# Patient Record
Sex: MALE | Race: WHITE | URBAN - METROPOLITAN AREA
[De-identification: names, ages, dates, MRNs, and addresses within clinical notes are randomized per-mention and may not be internally consistent; named-entity substitution may affect disease eponyms.]

---

## 2019-07-19 ENCOUNTER — OFFICE VISIT (OUTPATIENT)
Dept: FAMILY MEDICINE | Facility: CLINIC | Age: 24
End: 2019-07-19
Payer: COMMERCIAL

## 2019-07-19 VITALS
TEMPERATURE: 97.3 F | RESPIRATION RATE: 16 BRPM | HEART RATE: 87 BPM | WEIGHT: 169 LBS | DIASTOLIC BLOOD PRESSURE: 78 MMHG | OXYGEN SATURATION: 99 % | SYSTOLIC BLOOD PRESSURE: 118 MMHG | BODY MASS INDEX: 24.2 KG/M2 | HEIGHT: 70 IN

## 2019-07-19 DIAGNOSIS — R10.84 ABDOMINAL DISCOMFORT, GENERALIZED: Primary | ICD-10-CM

## 2019-07-19 SDOH — HEALTH STABILITY: MENTAL HEALTH: HOW MANY STANDARD DRINKS CONTAINING ALCOHOL DO YOU HAVE ON A TYPICAL DAY?: 1 OR 2

## 2019-07-19 SDOH — HEALTH STABILITY: MENTAL HEALTH: HOW OFTEN DO YOU HAVE A DRINK CONTAINING ALCOHOL?: 2-4 TIMES A MONTH

## 2019-07-19 SDOH — HEALTH STABILITY: MENTAL HEALTH: HOW OFTEN DO YOU HAVE 6 OR MORE DRINKS ON ONE OCCASION?: NEVER

## 2019-07-19 ASSESSMENT — ANXIETY QUESTIONNAIRES
3. WORRYING TOO MUCH ABOUT DIFFERENT THINGS: SEVERAL DAYS
GAD7 TOTAL SCORE: 1
7. FEELING AFRAID AS IF SOMETHING AWFUL MIGHT HAPPEN: NOT AT ALL
1. FEELING NERVOUS, ANXIOUS, OR ON EDGE: NOT AT ALL
5. BEING SO RESTLESS THAT IT IS HARD TO SIT STILL: NOT AT ALL
2. NOT BEING ABLE TO STOP OR CONTROL WORRYING: NOT AT ALL
IF YOU CHECKED OFF ANY PROBLEMS ON THIS QUESTIONNAIRE, HOW DIFFICULT HAVE THESE PROBLEMS MADE IT FOR YOU TO DO YOUR WORK, TAKE CARE OF THINGS AT HOME, OR GET ALONG WITH OTHER PEOPLE: NOT DIFFICULT AT ALL
6. BECOMING EASILY ANNOYED OR IRRITABLE: NOT AT ALL

## 2019-07-19 ASSESSMENT — PATIENT HEALTH QUESTIONNAIRE - PHQ9
SUM OF ALL RESPONSES TO PHQ QUESTIONS 1-9: 5
5. POOR APPETITE OR OVEREATING: NOT AT ALL

## 2019-07-19 ASSESSMENT — MIFFLIN-ST. JEOR: SCORE: 1756.48

## 2019-07-19 NOTE — PATIENT INSTRUCTIONS
Patient Education     GERD (Adult)    The esophagus is a tube that carries food from the mouth to the stomach. A valve (the LES, lower esophageal sphincter) at the lower end of the esophagus prevents stomach acid from flowing upward. When this valve doesn't work properly, stomach contents may repeatedly flow back up (reflux) into the esophagus. This is called gastroesophageal reflux disease (GERD). GERD can irritate the esophagus. It can cause problems with pain, swallowing or breathing. In severe cases, GERD can cause recurrent pneumonia (from aspiration or breathing in particles) or other serious problems.  Symptoms of reflux include burning, pressure or sharp pain in the upper abdomen or mid to lower chest. The pain can spread to the neck, back, or shoulder. There may be belching, an acid taste in the back of the throat, chronic cough, or sore throat, or hoarseness. GERD symptoms often occur during the day after a big meal. They can also occur at night when lying down.   Home care  Lifestyle changes can help reduce symptoms. If needed, your healthcare provider may prescribe medicines. Symptoms often improve with treatment, but if treatment is stopped, the symptoms often return after a few months. So most persons with GERD will need to continue treatment or get treatment on and off.  Lifestyle changes    Limit or avoid fatty, fried, and spicy foods, as well as coffee, chocolate, mint, and foods with high acid content such as tomatoes and citrus fruit and juices (orange, grapefruit, lemon).    Don t eat large meals, especially at night. Frequent, smaller meals are best. Don't lie down right after eating. And don t eat anything 3 hours before going to bed.    Don't drink alcohol or smoke. As much as possible, stay away from second hand smoke.    If you are overweight, losing weight will reduce symptoms.     Don't wear tight clothing around your stomach area.    If your symptoms occur during sleep, use a foam wedge  "to elevate your upper body (not just your head.) Or, place 4\" blocks under the head of your bed. Or use 2 bed risers under your bedframe.  Medicines  If needed, medicines can help relieve the symptoms of GERD and prevent damage to the esophagus. Discuss a medicine plan with your healthcare provider. This may include one or more of the following medicines:    Antacids to help neutralize the normal acids in your stomach.    Acid blockers (Histamine or H2 blockers) to decrease acid production.    Acid inhibitors (proton pump inhibitors PPIs) to decrease acid production in a different way than the blockers. They may work better, but can take a little longer to take effect.  Take an antacid 30 to 60 minutes after eating and at bedtime, but not at the same time as an acid blocker.Try not to take medicines such as ibuprofen and aspirin. If you are taking aspirin for your heart or other medical reasons, talk to your healthcare provider about stopping it.  Follow-up care  Follow up with your healthcare provider or as advised by our staff.  When to seek medical advice  Call your healthcare provider if any of the following occur:    Stomach pain gets worse or moves to the lower right abdomen (appendix area)    Chest pain appears or gets worse, or spreads to the back, neck, shoulder, or arm    An over-the-counter trial of medicine doesn't relieve your symptoms    Weight loss that can't be explained    Trouble or pain swallowing    Frequent vomiting (can t keep down liquids)    Blood in the stool or vomit (red or black in color)    Feeling weak or dizzy    Fever of 100.4 F (38 C) or higher, or as directed by your healthcare provider  Date Last Reviewed: 3/1/2018    3757-8811 [employer]. [address] [place] [address], [place] [address]. All rights reserved. This information is not intended as a substitute for professional medical care. Always follow your healthcare professional's instructions.           "

## 2019-07-19 NOTE — NURSING NOTE
"24 year old  Chief Complaint   Patient presents with     Abdominal Pain     Pt. presents to the clinic today with stomach pain X 9 days.        Blood pressure 118/78, pulse 87, temperature 97.3  F (36.3  C), temperature source Oral, resp. rate 16, height 1.768 m (5' 9.6\"), weight 76.7 kg (169 lb), SpO2 99 %. Body mass index is 24.53 kg/m .  BP completed using cuff size:    There is no problem list on file for this patient.      Wt Readings from Last 2 Encounters:   07/19/19 76.7 kg (169 lb)     BP Readings from Last 3 Encounters:   07/19/19 118/78       No Known Allergies    No current outpatient medications on file.     No current facility-administered medications for this visit.        Social History     Tobacco Use     Smoking status: Never Smoker     Smokeless tobacco: Never Used   Substance Use Topics     Alcohol use: Yes     Frequency: 2-4 times a month     Drinks per session: 1 or 2     Binge frequency: Never     Drug use: Never       Honoring Choices - Health Care Directive Guide offered to patient at time of visit.    Health Maintenance Due   Topic Date Due     PREVENTIVE CARE VISIT  1995     DTAP/TDAP/TD IMMUNIZATION (1 - Tdap) 06/05/2002     HIV SCREENING  06/05/2010     PHQ-2  01/01/2019         There is no immunization history on file for this patient.    No results found for: PAP      No lab results found.    PHQ-2 ( 1999 Pfizer) 7/19/2019   Q1: Little interest or pleasure in doing things 0   Q2: Feeling down, depressed or hopeless 0   PHQ-2 Score 0       PHQ-9 SCORE 7/19/2019   PHQ-9 Total Score 5       JATIN-7 SCORE 7/19/2019   Total Score 1       No flowsheet data found.    Risa White CMA  July 19, 2019 1:05 PM    "
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
## 2019-07-20 ASSESSMENT — ANXIETY QUESTIONNAIRES: GAD7 TOTAL SCORE: 1

## 2019-08-06 ENCOUNTER — OFFICE VISIT (OUTPATIENT)
Dept: FAMILY MEDICINE | Facility: CLINIC | Age: 24
End: 2019-08-06
Payer: COMMERCIAL

## 2019-08-06 VITALS
HEART RATE: 62 BPM | HEIGHT: 70 IN | TEMPERATURE: 98 F | OXYGEN SATURATION: 96 % | BODY MASS INDEX: 24.09 KG/M2 | DIASTOLIC BLOOD PRESSURE: 78 MMHG | SYSTOLIC BLOOD PRESSURE: 115 MMHG | RESPIRATION RATE: 16 BRPM | WEIGHT: 168.3 LBS

## 2019-08-06 DIAGNOSIS — K21.9 GASTROESOPHAGEAL REFLUX DISEASE, ESOPHAGITIS PRESENCE NOT SPECIFIED: Primary | ICD-10-CM

## 2019-08-06 ASSESSMENT — MIFFLIN-ST. JEOR: SCORE: 1761.24

## 2019-08-06 NOTE — NURSING NOTE
"24 year old  Chief Complaint   Patient presents with     Abdominal Pain     acid reflux, [place], took for 3 [place] and stopped per directions, symptoms came back, feels like stomach is burning       Blood pressure 115/78, pulse 62, temperature 98  F (36.7  C), temperature source Oral, resp. rate 16, height 1.781 m (5' 10.1\"), weight 76.3 kg (168 lb 4.8 oz), SpO2 96 %. Body mass index is 24.08 kg/m .  BP completed using cuff size:    There is no problem list on file for this patient.      Wt Readings from Last 2 Encounters:   08/06/19 76.3 kg (168 lb 4.8 oz)   07/19/19 76.7 kg (169 lb)     BP Readings from Last 3 Encounters:   08/06/19 115/78   07/19/19 118/78       No Known Allergies    Current Outpatient Medications   Medication     omeprazole (PRILOSEC) 20 MG DR capsule     No current facility-administered medications for this visit.        Social History     Tobacco Use     Smoking status: Never Smoker     Smokeless tobacco: Never Used   Substance Use Topics     Alcohol use: Yes     Frequency: 2-4 times a month     Drinks per session: 1 or 2     Binge frequency: Never     Drug use: Never       Honoring Choices - Health Care Directive Guide offered to patient at time of visit.    Health Maintenance Due   Topic Date Due     PREVENTIVE CARE VISIT  1995     DTAP/TDAP/TD IMMUNIZATION (1 - Tdap) 06/05/2002     HIV SCREENING  06/05/2010         There is no immunization history on file for this patient.    No results found for: PAP      No lab results found.    PHQ-2 ( 1999 Pfizer) 7/19/2019   Q1: Little interest or pleasure in doing things 0   Q2: Feeling down, depressed or hopeless 0   PHQ-2 Score 0       PHQ-9 SCORE 7/19/2019   PHQ-9 Total Score 5       JATIN-7 SCORE 7/19/2019   Total Score 1       No flowsheet data found.      Flower El CMA  August 6, 2019 8:17 AM    "

## 2019-08-06 NOTE — PROGRESS NOTES
HPI       Dain MYRNA Yap is a 24 year old male with no significant past medical history who presents for     Chief Complaint   Patient presents with     Abdominal Pain     acid reflux, [place] took for 3 week and stopped per directions, symptoms came back, feels like stomach is burning       Dain presents today for abdominal pain follow-up. He was evaluated for this problem on 7/15/19 in urgent care and again on 7/19/19 by this writer. Strep test, hepatic function panel, CBC with differential, BMP, and lipase were all normal. GI cocktail administered in urgent care helped to relieve the burning sensation in his abdomen. It was thought that his abdominal pain was most consistent with GERD and he was recommended to initiate a 3 week trial of omeprazole in addition to lifestyle and dietary changes. Was instructed to take omeprazole twice daily x one week, then once daily x two additional weeks, then stop.     Dain reports today that his abdominal pain improved on the three week trial of omeprazole, however three days after completing the trial his abdominal pain returned, described as generalized burning sensation in his abdomen occurring two hours after eating food and while laying down, sometimes discomfort radiates up into his middle chest. He notes the burning to be severe to the point where he feels the warmth on the skin of his abdomen. When pain is present, it causes sleep disruptions. He did not go to work yesterday due to not sleeping well the night prior. Reports he restarted the omeprazole secondary to severity of symptoms.     Dain notes that despite improvement in his abdominal discomfort while on omeprazole, abdominal bloating has persisted. He also complains of increased burping. Nausea seems to have improved. He reports abdominal cramping that has persisted since initial onset of abdominal pain, he doesn't feel this is connected to eating food. Reports he is having  "normal bowel movements, denies constipation, diarrhea, and blood in the stool. He continues to be active, plays tennis regularly and tolerates this quite well.     Dain reports that since symptom onset he has been abstaining from alcohol use, NSAID use, and caffeine. He feels confident that his symptoms are tied to acid reflux and has been avoiding identified dietary triggers. Admits that he occasionally slips on his diet and experiences symptoms of acid reflux.       Wt Readings from Last 2 Encounters:   08/06/19 76.3 kg (168 lb 4.8 oz)   07/19/19 76.7 kg (169 lb)         Past Medical History:   Diagnosis Date     NO ACTIVE PROBLEMS      Current Outpatient Medications   Medication     omeprazole (PRILOSEC) 20 MG DR capsule     No current facility-administered medications for this visit.       No Known Allergies      Problem, Medication and Allergy Lists were reviewed and updated if needed..    Patient is an established patient of this clinic..         Review of Systems:   Review of Systems   Constitutional: Negative for activity change, fever and unexpected weight change.   Gastrointestinal: Positive for abdominal distention and abdominal pain. Negative for blood in stool, constipation, diarrhea, nausea and vomiting.   Psychiatric/Behavioral: Positive for sleep disturbance.          Physical Exam:     Vitals:    08/06/19 0816   BP: 115/78   Pulse: 62   Resp: 16   Temp: 98  F (36.7  C)   TempSrc: Oral   SpO2: 96%   Weight: 76.3 kg (168 lb 4.8 oz)   Height: 1.781 m (5' 10.1\")     Body mass index is 24.08 kg/m .  Vitals were reviewed and were normal.     Physical Exam   Constitutional: He is oriented to person, place, and time. He appears well-developed and well-nourished. No distress.   Cardiovascular: Normal rate.   Pulmonary/Chest: Effort normal. No respiratory distress.   Abdominal: Soft. He exhibits no distension and no mass. Bowel sounds are increased. There is no tenderness.   Neurological: He is alert and " oriented to person, place, and time.   Skin: Skin is warm and dry.   Psychiatric: He has a normal mood and affect. His behavior is normal.       Results:     No laboratory testing was completed at today's visit.    Assessment and Plan      1. Gastroesophageal reflux disease, esophagitis presence not specified  Comment: Pt with recent hx of abdominal discomfort most consistent with GERD, improved on 3 weeks trial of PPI. Pain returned upon completion of trial, causing sleep disruptions and missed days at work. Physical exam is normal today and patient is well appearing. Recommend restarting and continuing PPI x 2 months, will consider step down to H2RA at that time if symptoms are well controlled. Pt is in agreement with this plan.    Plan:   - omeprazole (PRILOSEC) 20 MG DR capsule; Take 1 capsule (20 mg) by mouth daily  Dispense: 30 capsule; Refill: 1   - Reinforced how to take above medication appropriately   - Continue avoiding dietary triggers and implementing lifestyle changes   - Avoid NSAIDs, alcohol, caffeine   - Counseled on reasons to return to clinic    Follow-up: Return to clinic in 6 weeks for medication management and follow-up.     There are no discontinued medications.    Options for treatment and follow-up care were reviewed with the patient. Dain Yap  engaged in the decision making process and verbalized understanding of the options discussed and agreed with the final plan.    LETICIA Kat CNP

## 2019-08-07 ASSESSMENT — ENCOUNTER SYMPTOMS
DIARRHEA: 0
ABDOMINAL DISTENTION: 1
ACTIVITY CHANGE: 0
CONSTIPATION: 0
VOMITING: 0
UNEXPECTED WEIGHT CHANGE: 0
NAUSEA: 0
SLEEP DISTURBANCE: 1
BLOOD IN STOOL: 0
FEVER: 0
ABDOMINAL PAIN: 1

## 2019-09-23 ENCOUNTER — TELEPHONE (OUTPATIENT)
Dept: FAMILY MEDICINE | Facility: CLINIC | Age: 24
End: 2019-09-23

## 2019-09-23 DIAGNOSIS — K21.9 GASTROESOPHAGEAL REFLUX DISEASE, ESOPHAGITIS PRESENCE NOT SPECIFIED: Primary | ICD-10-CM

## 2019-09-23 NOTE — TELEPHONE ENCOUNTER
[facility] phone call message - order or referral request from patient:     Order or referral being requested: Referral to Gastroenterology     Additional Details: Medication is working but still underlying symptoms but still have the other symptoms (dull ache, bloating, sore throat from acid issues) Patient would like to get a referral to Gastroenterology.    OK to leave a message on voice mail? Yes    Primary language: English      needed? No    Call taken on September 23, 2019 at 10:17 AM by Flower El CMA

## 2019-10-03 ENCOUNTER — TRANSFERRED RECORDS (OUTPATIENT)
Dept: HEALTH INFORMATION MANAGEMENT | Facility: CLINIC | Age: 24
End: 2019-10-03

## 2019-10-21 ENCOUNTER — TRANSFERRED RECORDS (OUTPATIENT)
Dept: HEALTH INFORMATION MANAGEMENT | Facility: CLINIC | Age: 24
End: 2019-10-21

## 2019-12-05 ENCOUNTER — TRANSFERRED RECORDS (OUTPATIENT)
Dept: HEALTH INFORMATION MANAGEMENT | Facility: CLINIC | Age: 24
End: 2019-12-05

## 2020-12-24 ENCOUNTER — OFFICE VISIT (OUTPATIENT)
Dept: FAMILY MEDICINE | Facility: CLINIC | Age: 25
End: 2020-12-24
Payer: COMMERCIAL

## 2020-12-24 VITALS
TEMPERATURE: 97.4 F | HEIGHT: 70 IN | WEIGHT: 169 LBS | HEART RATE: 71 BPM | OXYGEN SATURATION: 99 % | SYSTOLIC BLOOD PRESSURE: 135 MMHG | RESPIRATION RATE: 16 BRPM | BODY MASS INDEX: 24.2 KG/M2 | DIASTOLIC BLOOD PRESSURE: 88 MMHG

## 2020-12-24 DIAGNOSIS — H61.23 EXCESSIVE CERUMEN IN BOTH EAR CANALS: ICD-10-CM

## 2020-12-24 DIAGNOSIS — R07.0 THROAT PAIN: Primary | ICD-10-CM

## 2020-12-24 LAB — S PYO AG THROAT QL IA.RAPID: NEGATIVE

## 2020-12-24 ASSESSMENT — MIFFLIN-ST. JEOR: SCORE: 1757.83

## 2020-12-24 NOTE — NURSING NOTE
"25 year old  Chief Complaint   Patient presents with     Pharyngitis     Pt. presents to the clinic today with swollen tonsils X 2 plus weeks.        Blood pressure 135/88, pulse 71, temperature 97.4  F (36.3  C), temperature source Oral, resp. rate 16, height 1.778 m (5' 10\"), weight 76.7 kg (169 lb), SpO2 99 %. Body mass index is 24.25 kg/m .  BP completed using cuff size:    There is no problem list on file for this patient.      Wt Readings from Last 2 Encounters:   12/24/20 76.7 kg (169 lb)   08/06/19 76.3 kg (168 lb 4.8 oz)     BP Readings from Last 3 Encounters:   12/24/20 135/88   08/06/19 115/78   07/19/19 118/78       No Known Allergies    Current Outpatient Medications   Medication     omeprazole (PRILOSEC) 20 MG DR capsule     omeprazole (PRILOSEC) 20 MG DR capsule     No current facility-administered medications for this visit.        Social History     Tobacco Use     Smoking status: Never Smoker     Smokeless tobacco: Never Used   Substance Use Topics     Alcohol use: Yes     Frequency: 2-4 times a month     Drinks per session: 1 or 2     Binge frequency: Never     Drug use: Never       Honoring Choices - Health Care Directive Guide offered to patient at time of visit.    Health Maintenance Due   Topic Date Due     PREVENTIVE CARE VISIT  1995     HPV IMMUNIZATION (1 - Male 2-dose series) 06/05/2006     HIV SCREENING  06/05/2010     HEPATITIS C SCREENING  06/05/2013     DTAP/TDAP/TD IMMUNIZATION (7 - Td) 04/04/2018     INFLUENZA VACCINE (1) 09/01/2020       Immunization History   Administered Date(s) Administered     DTAP (<7y) 10/07/1997, 06/22/2000     Hep B, Peds or Adolescent 1995, 1995, 02/09/1996     Hib (PRP-T) 09/10/1996     Historical HIB 1995, 1995, 1995     MMR 09/10/1996, 06/22/2000     Meningococcal (Menactra ) 04/04/2008, 06/04/2014     Meningococcal (Menveo ) 06/04/2014     Polio, Unspecified  1995, 1995, 1995, 06/22/2000     TDAP " Vaccine (Adacel) 04/04/2008     Varicella 10/18/1996, 04/04/2008       No results found for: PAP      No lab results found.    PHQ-2 ( 1999 Pfizer) 12/24/2020 7/19/2019   Q1: Little interest or pleasure in doing things 0 0   Q2: Feeling down, depressed or hopeless 0 0   PHQ-2 Score 0 0       PHQ-9 SCORE 7/19/2019   PHQ-9 Total Score 5       JATIN-7 SCORE 7/19/2019   Total Score 1       No flowsheet data found.  bilateral ear lavage done.  Moderate amount of cerumen irrigated using luke warm water and hydrogen peroxide without incident. Patient tolerated procedure well.       Risa White, FLACA  December 24, 2020 9:15 AM

## 2020-12-24 NOTE — PROGRESS NOTES
Dain Yap is a 25 year old male who presents today for right swollen tonsil, throat soreness, and pain in right ear. Dain states he is experiencing right ear discomfort and intermittent swollen tonsil on the right side. He states he frequently experiences episodic swollen tonsils. He states he his right tonsil is sore, swollen, and red. He states last week he noticed cervical lymphadenopathy. Dain also states he has been experiencing episodic ear discomfort and fullness. He states he has decreased hearing in his right ear. He describes his ear popping during the night. He states his right ear is more full at night and with exercise. He denies dysphagia or difficulties eating and drinking. He denies fever and malaise. Denies SOB, cough, chest pain, and sinus pressure. He started taking ibuprofen one week ago as needed to help decrease inflammation.     Review Of Systems  General: negative for fevers and malaise   Eyes: negative for, eye pain, redness, itching  Ears/Nose/Throat: Positive for decreased hearing on right side. Positive for ear fullness. Positive for sore, swollen right tonsil. Negative for, nasal congestion, purulent rhinorrhea, postnasal drainage, sinus trouble  Respiratory: No shortness of breath, dyspnea on exertion, cough, or hemoptysis  Cardiovascular: negative for, palpitations, chest pain and exertional chest pain or pressure  Gastrointestinal: negative for, dysphagia, heartburn and reflux. Hx of acid reflux.   Psychiatric: negative      Past Medical History:   Diagnosis Date     NO ACTIVE PROBLEMS      Past Surgical History:   Procedure Laterality Date     NO HISTORY OF SURGERY       Social History     Socioeconomic History     Marital status: Single     Spouse name: Not on file     Number of children: Not on file     Years of education: Not on file     Highest education level: Not on file   Occupational History     Not on file   Social Needs     Financial resource strain: Not on  "file     Food insecurity     Worry: Not on file     Inability: Not on file     Transportation needs     Medical: Not on file     Non-medical: Not on file   Tobacco Use     Smoking status: Never Smoker     Smokeless tobacco: Never Used   Substance and Sexual Activity     Alcohol use: Yes     Frequency: 2-4 times a month     Drinks per session: 1 or 2     Binge frequency: Never     Drug use: Never     Sexual activity: Not Currently     Partners: Female   Lifestyle     Physical activity     Days per week: Not on file     Minutes per session: Not on file     Stress: Not on file   Relationships     Social connections     Talks on phone: Not on file     Gets together: Not on file     Attends Zoroastrianism service: Not on file     Active member of club or organization: Not on file     Attends meetings of clubs or organizations: Not on file     Relationship status: Not on file     Intimate partner violence     Fear of current or ex partner: Not on file     Emotionally abused: Not on file     Physically abused: Not on file     Forced sexual activity: Not on file   Other Topics Concern     Not on file   Social History Narrative     at [employer].      Family History   Problem Relation Age of Onset     No Known Problems Mother      No Known Problems Father      No Known Problems Brother      Hypertension Maternal Grandmother      Dementia Maternal Grandmother      Hypertension Maternal Grandfather      Dementia Maternal Grandfather      Dementia Paternal Grandmother      No Known Problems Paternal Grandfather        /88   Pulse 71   Temp 97.4  F (36.3  C) (Oral)   Resp 16   Ht 1.778 m (5' 10\")   Wt 76.7 kg (169 lb)   SpO2 99%   BMI 24.25 kg/m      Exam:  Constitutional: healthy, alert and no distress  Head: Normocephalic. No masses, lesions, tenderness or abnormalities  Neck: Neck supple. No adenopathy. Thyroid symmetric, normal size,  ENT: Large amount of impacted cerumen bilaterally blocking " TMs. Erythema of right ear canal. Sinuses nontender. Tonsils +2 bilaterally without exudate.   Respiratory: negative, Percussion normal. Good diaphragmatic excursion. All lung fields clear to auscultation.  Psychiatric: mentation appears normal and anxious     Assessment/Plan:  1. Throat pain    - Rapid Strep Screen (Group) ([facility])  - Beta strep group A culture    2. Excessive cerumen in both ear canals    - REMOVE IMPACTED CERUMEN    Removed impacted cerumen from both ears. Dain tolerated the procedure well. Instructed Dain to keep ears protected while outside to prevent irritation to the ear canals. Instructed Dain to follow-up with the clinic if throat or ear pain worsens.       Options for treatment and follow-up care were reviewed with the patient. Patient engaged in the decision making process and verbalized understanding of the options discussed and agreed with the final plan.    Electronically signed by: Ning Dewitt RN, [facility], DNP-FNP student

## 2020-12-26 LAB
BACTERIA SPEC CULT: NORMAL
Lab: NORMAL
SPECIMEN SOURCE: NORMAL